# Patient Record
Sex: MALE | URBAN - METROPOLITAN AREA
[De-identification: names, ages, dates, MRNs, and addresses within clinical notes are randomized per-mention and may not be internally consistent; named-entity substitution may affect disease eponyms.]

---

## 2019-04-09 ENCOUNTER — TELEPHONE (OUTPATIENT)
Dept: TRANSPLANT | Facility: CLINIC | Age: 44
End: 2019-04-09

## 2019-04-09 NOTE — TELEPHONE ENCOUNTER
Received call from referring provider that he was sending the pt to our ER from [place]. 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     Spoke with referring.  Pt has [place]ID by records. Informed the referring,  from a transplant perspective,  we do not take [place] Medicaid.PT can go anywhere for medical care, but not for transplant. Per referring office the doctor has advised the pt to come to [facility] anyway. I called and spoke to the wife. Apparently pt was denied at [facility] because their assessment by the txp team there stated the pt does not have the means to afford post transplant medications/care. Informed wife that regardless of reason, we do not take [place] medicaid for transplant. I told her I could not schedule the pt outpatient for transplant consult. She stated the pt is sick and was sent home to die. I suggested the appropriate way would be for the pt to go to his local hospital(pt discharged 4/7) and then pursue a transfer  any center that would be able to accept his insurance. I informed the pt's wife with the pt in the background multiple times and confirmed with financial, that we do not able to accept  [place] MEDICAID for transplant.

## 2019-04-10 ENCOUNTER — DOCUMENTATION ONLY (OUTPATIENT)
Dept: TRANSPLANT | Facility: CLINIC | Age: 44
End: 2019-04-10